# Patient Record
Sex: MALE | Race: WHITE
[De-identification: names, ages, dates, MRNs, and addresses within clinical notes are randomized per-mention and may not be internally consistent; named-entity substitution may affect disease eponyms.]

---

## 2023-08-15 ENCOUNTER — HOSPITAL ENCOUNTER (OUTPATIENT)
Dept: HOSPITAL 47 - ORWHC2ENDO | Age: 58
Discharge: HOME | End: 2023-08-15
Attending: SURGERY
Payer: MEDICARE

## 2023-08-15 VITALS — DIASTOLIC BLOOD PRESSURE: 62 MMHG | SYSTOLIC BLOOD PRESSURE: 122 MMHG

## 2023-08-15 VITALS — TEMPERATURE: 97.5 F | RESPIRATION RATE: 16 BRPM

## 2023-08-15 VITALS — BODY MASS INDEX: 38.2 KG/M2

## 2023-08-15 VITALS — HEART RATE: 62 BPM

## 2023-08-15 DIAGNOSIS — Z79.624: ICD-10-CM

## 2023-08-15 DIAGNOSIS — I10: ICD-10-CM

## 2023-08-15 DIAGNOSIS — Z79.890: ICD-10-CM

## 2023-08-15 DIAGNOSIS — Z87.891: ICD-10-CM

## 2023-08-15 DIAGNOSIS — Z79.899: ICD-10-CM

## 2023-08-15 DIAGNOSIS — G47.33: ICD-10-CM

## 2023-08-15 DIAGNOSIS — Z90.49: ICD-10-CM

## 2023-08-15 DIAGNOSIS — Z12.11: Primary | ICD-10-CM

## 2023-08-15 DIAGNOSIS — E11.9: ICD-10-CM

## 2023-08-15 DIAGNOSIS — E78.5: ICD-10-CM

## 2023-08-15 DIAGNOSIS — Z94.0: ICD-10-CM

## 2023-08-15 DIAGNOSIS — K57.30: ICD-10-CM

## 2023-08-15 LAB — GLUCOSE BLD-MCNC: 110 MG/DL (ref 70–110)

## 2023-08-15 PROCEDURE — 45378 DIAGNOSTIC COLONOSCOPY: CPT

## 2023-08-15 NOTE — P.GSHP
History of Present Illness


H&P Date: 08/15/23


Chief Complaint: Colon cancer screening





58-year-old male here for colonoscopy.  Last colonoscopy over 10 years ago.  No 

bowel complaints.  No family history of colon cancer.





Past Medical History


Past Medical History: Cancer, Diabetes Mellitus, Hyperlipidemia, Hypertension, 

Renal Disease, Sleep Apnea/CPAP/BIPAP


Additional Past Medical History / Comment(s): COVID/stomach ulcer/skin cancer 

removed, CURRENTLY ON ATNITBIOTICS FOR RT FOOT WOUND-WAS ON IV ANTIBIOTICS FOR 3

WEEKS NOW ON ORAL-PT TO NOTIFY DR. PARISI'S OFFICE


History of Any Multi-Drug Resistant Organisms: None Reported


Past Surgical History: Bariatric Surgery, Cholecystectomy, Heart 

Catheterization, Orthopedic Surgery


Additional Past Surgical History / Comment(s): kidney transplant/nonfunctioning 

left arm fistula/lt ankle surgery/back surg L4-5.  RT FOOT SX


Past Anesthesia/Blood Transfusion Reactions: No Reported Reaction


Smoking Status: Former smoker





- Past Family History


  ** Mother


Family Medical History: No Reported History





Medications and Allergies


                                Home Medications











 Medication  Instructions  Recorded  Confirmed  Type


 


Atorvastatin [Lipitor] 1 tab PO DAILY 06/10/22 08/15/23 History


 


Insulin Aspart Prot/Insuln Asp 1 dose SQ AS DIRECTED PRN 06/10/22 08/15/23 

History





[Novolog Mix 70-30 Flexpen]    


 


Sodium Bicarbonate 650 mg PO DAILY 06/10/22 08/15/23 History


 


Tacrolimus [Prograf] 1 tab PO BID 06/10/22 08/15/23 History


 


mycophenolate mofetiL [Cellcept] 500 mg PO BID 06/10/22 08/15/23 History


 


Levothyroxine Sodium [Synthroid] 75 mcg PO DAILY 08/15/23 08/15/23 History


 


Nitrofurantoin Monohyd/M-Cryst 1 tab PO TID 08/15/23 08/15/23 History





[Macrobid]    








                                    Allergies











Allergy/AdvReac Type Severity Reaction Status Date / Time


 


morphine AdvReac  Nausea Verified 08/15/23 08:55














Surgical - Exam


                                   Vital Signs











Temp Pulse Resp BP Pulse Ox


 


 97.5 F L  71   16   175/82   96 


 


 08/15/23 08:52  08/15/23 08:52  08/15/23 08:52  08/15/23 08:52  08/15/23 08:52

















Physical exam:


General: Well-developed, well-nourished


HEENT: Normocephalic, sclerae nonicteric


Abdomen: Nontender, nondistended


Extremities: No edema


Neuro: Alert and oriented





Assessment and Plan


(1) Colon cancer screening


Narrative/Plan: 


Will proceed with colonoscopy at this time.


Current Visit: Yes   Status: Acute   Code(s): Z12.11 - ENCOUNTER FOR SCREENING 

FOR MALIGNANT NEOPLASM OF COLON   SNOMED Code(s): 944281843

## 2023-08-15 NOTE — P.PCN
Date of Procedure: 08/15/23


Procedure(s) Performed: 


PREOPERATIVE DIAGNOSIS: Colon cancer screening


POSTOPERATIVE DIAGNOSIS: Diverticulosis


PROCEDURE: Colonoscopy 


ANESTHESIA: MAC


SURGEON: Chi Esquivel M.D.


SPECIMENS: None


ENDOSCOPIC PROCEDURE:  The patient was placed on the endoscopy table in the left

decubitus position.  The Olympus colonoscope was inserted into the anus and 

passed under direct visualization to the base of the cecum.  The appendiceal 

orifice was visualized.  From that point the scope was slowly withdrawn inspe

cting all surfaces carefully.  There were no neoplastic inflammatory or polypoid

lesions throughout the cecum, ascending, transverse, descending, sigmoid and 

rectum.  There was mild left-sided diverticulosis noted with tortuosity.  

Digital rectal examination was normal.  The patient was taken to the recovery 

room in stable condition per anesthesia guidelines.


RECOMMENDATIONS: Resume diet.  Repeat colonoscopy 10 years.